# Patient Record
Sex: MALE | Race: BLACK OR AFRICAN AMERICAN | NOT HISPANIC OR LATINO | Employment: OTHER | URBAN - METROPOLITAN AREA
[De-identification: names, ages, dates, MRNs, and addresses within clinical notes are randomized per-mention and may not be internally consistent; named-entity substitution may affect disease eponyms.]

---

## 2022-08-23 ENCOUNTER — OFFICE VISIT (OUTPATIENT)
Dept: FAMILY MEDICINE CLINIC | Facility: CLINIC | Age: 72
End: 2022-08-23

## 2022-08-23 ENCOUNTER — LAB (OUTPATIENT)
Dept: LAB | Facility: HOSPITAL | Age: 72
End: 2022-08-23

## 2022-08-23 VITALS
OXYGEN SATURATION: 99 % | HEART RATE: 72 BPM | DIASTOLIC BLOOD PRESSURE: 82 MMHG | TEMPERATURE: 98.7 F | BODY MASS INDEX: 20.17 KG/M2 | SYSTOLIC BLOOD PRESSURE: 134 MMHG | RESPIRATION RATE: 18 BRPM | WEIGHT: 157.2 LBS | HEIGHT: 74 IN

## 2022-08-23 DIAGNOSIS — R21 RASH: ICD-10-CM

## 2022-08-23 DIAGNOSIS — R09.89 OTHER SPECIFIED SYMPTOMS AND SIGNS INVOLVING THE CIRCULATORY AND RESPIRATORY SYSTEMS: ICD-10-CM

## 2022-08-23 DIAGNOSIS — Z12.11 SCREEN FOR COLON CANCER: ICD-10-CM

## 2022-08-23 DIAGNOSIS — Z11.3 SCREENING EXAMINATION FOR STD (SEXUALLY TRANSMITTED DISEASE): ICD-10-CM

## 2022-08-23 DIAGNOSIS — M79.89 LEG SWELLING: ICD-10-CM

## 2022-08-23 DIAGNOSIS — Z00.01 ENCOUNTER FOR ROUTINE ADULT HEALTH EXAMINATION WITH ABNORMAL FINDINGS: ICD-10-CM

## 2022-08-23 DIAGNOSIS — Z13.6 SCREENING FOR AAA (ABDOMINAL AORTIC ANEURYSM): ICD-10-CM

## 2022-08-23 DIAGNOSIS — L29.9 PRURITUS, UNSPECIFIED: ICD-10-CM

## 2022-08-23 LAB
ALBUMIN SERPL-MCNC: 4.6 G/DL (ref 3.5–5.2)
ALBUMIN/GLOB SERPL: 1.4 G/DL
ALP SERPL-CCNC: 74 U/L (ref 39–117)
ALT SERPL W P-5'-P-CCNC: 9 U/L (ref 1–41)
ANION GAP SERPL CALCULATED.3IONS-SCNC: 11.2 MMOL/L (ref 5–15)
AST SERPL-CCNC: 18 U/L (ref 1–40)
BILIRUB SERPL-MCNC: 0.4 MG/DL (ref 0–1.2)
BUN SERPL-MCNC: 10 MG/DL (ref 8–23)
BUN/CREAT SERPL: 11 (ref 7–25)
CALCIUM SPEC-SCNC: 10.1 MG/DL (ref 8.6–10.5)
CHLORIDE SERPL-SCNC: 105 MMOL/L (ref 98–107)
CHOLEST SERPL-MCNC: 198 MG/DL (ref 0–200)
CO2 SERPL-SCNC: 25.8 MMOL/L (ref 22–29)
CREAT SERPL-MCNC: 0.91 MG/DL (ref 0.76–1.27)
CRP SERPL-MCNC: 0.53 MG/DL (ref 0–0.5)
EGFRCR SERPLBLD CKD-EPI 2021: 90.1 ML/MIN/1.73
GLOBULIN UR ELPH-MCNC: 3.3 GM/DL
GLUCOSE SERPL-MCNC: 81 MG/DL (ref 65–99)
HDLC SERPL-MCNC: 46 MG/DL (ref 40–60)
HIV1+2 AB SER QL: NORMAL
LDLC SERPL CALC-MCNC: 136 MG/DL (ref 0–100)
LDLC/HDLC SERPL: 2.91 {RATIO}
POTASSIUM SERPL-SCNC: 4.9 MMOL/L (ref 3.5–5.2)
PROT SERPL-MCNC: 7.9 G/DL (ref 6–8.5)
SODIUM SERPL-SCNC: 142 MMOL/L (ref 136–145)
TRIGL SERPL-MCNC: 90 MG/DL (ref 0–150)
TSH SERPL DL<=0.05 MIU/L-ACNC: 2.48 UIU/ML (ref 0.27–4.2)
VLDLC SERPL-MCNC: 16 MG/DL (ref 5–40)

## 2022-08-23 PROCEDURE — 86592 SYPHILIS TEST NON-TREP QUAL: CPT

## 2022-08-23 PROCEDURE — 99204 OFFICE O/P NEW MOD 45 MIN: CPT | Performed by: STUDENT IN AN ORGANIZED HEALTH CARE EDUCATION/TRAINING PROGRAM

## 2022-08-23 PROCEDURE — 1170F FXNL STATUS ASSESSED: CPT | Performed by: STUDENT IN AN ORGANIZED HEALTH CARE EDUCATION/TRAINING PROGRAM

## 2022-08-23 PROCEDURE — 84443 ASSAY THYROID STIM HORMONE: CPT | Performed by: STUDENT IN AN ORGANIZED HEALTH CARE EDUCATION/TRAINING PROGRAM

## 2022-08-23 PROCEDURE — G0432 EIA HIV-1/HIV-2 SCREEN: HCPCS | Performed by: STUDENT IN AN ORGANIZED HEALTH CARE EDUCATION/TRAINING PROGRAM

## 2022-08-23 PROCEDURE — 80061 LIPID PANEL: CPT | Performed by: STUDENT IN AN ORGANIZED HEALTH CARE EDUCATION/TRAINING PROGRAM

## 2022-08-23 PROCEDURE — 86803 HEPATITIS C AB TEST: CPT | Performed by: STUDENT IN AN ORGANIZED HEALTH CARE EDUCATION/TRAINING PROGRAM

## 2022-08-23 PROCEDURE — 86140 C-REACTIVE PROTEIN: CPT | Performed by: STUDENT IN AN ORGANIZED HEALTH CARE EDUCATION/TRAINING PROGRAM

## 2022-08-23 PROCEDURE — 80053 COMPREHEN METABOLIC PANEL: CPT | Performed by: STUDENT IN AN ORGANIZED HEALTH CARE EDUCATION/TRAINING PROGRAM

## 2022-08-23 PROCEDURE — 1159F MED LIST DOCD IN RCRD: CPT | Performed by: STUDENT IN AN ORGANIZED HEALTH CARE EDUCATION/TRAINING PROGRAM

## 2022-08-23 PROCEDURE — G0438 PPPS, INITIAL VISIT: HCPCS | Performed by: STUDENT IN AN ORGANIZED HEALTH CARE EDUCATION/TRAINING PROGRAM

## 2022-08-23 NOTE — PROGRESS NOTES
The ABCs of the Annual Wellness Visit  Initial Medicare Wellness Visit    Chief Complaint   Patient presents with   • Rash     Patient est care with new pcp and having rash on legs he said, thinks this may have been a reaction from a vaccine? Patient would like physical today     Subjective   History of Present Illness:  Marv Dobbins is a 72 y.o. male who presents for an Initial Medicare Wellness Visit in addition to his primary reason to establish care and discuss rash.  See separate note for HPI regarding rash.    The following portions of the patient's history were reviewed and   updated as appropriate: allergies, current medications, past family history, past medical history, past social history, past surgical history and problem list.     Compared to one year ago, the patient feels his physical   health is the same.    Compared to one year ago, the patient feels his mental   health is the same.    Recent Hospitalizations:  He was not admitted to the hospital during the last year.       Current Medical Providers:  Patient Care Team:  Lonnie Alanis MD as PCP - General (Family Medicine)    No outpatient medications prior to visit.     No facility-administered medications prior to visit.       No opioid medication identified on active medication list. I have reviewed chart for other potential  high risk medication/s and harmful drug interactions in the elderly.          Aspirin is not on active medication list.  Aspirin use is not indicated based on review of current medical condition/s. Risk of harm outweighs potential benefits.  .    There is no problem list on file for this patient.    Advance Care Planning  Advance Directive is not on file.  ACP discussion was held with the patient during this visit. Patient does not have an advance directive, information provided.          Objective       Vitals:    08/23/22 0900   BP: 134/82   BP Location: Left arm   Patient Position: Sitting   Cuff Size: Adult   Pulse: 72  "  Resp: 18   Temp: 98.7 °F (37.1 °C)   TempSrc: Temporal   SpO2: 99%   Weight: 71.3 kg (157 lb 3.2 oz)   Height: 188 cm (74\")     Estimated body mass index is 20.18 kg/m² as calculated from the following:    Height as of this encounter: 188 cm (74\").    Weight as of this encounter: 71.3 kg (157 lb 3.2 oz).    BMI is within normal parameters. No other follow-up for BMI required.      Does the patient have evidence of cognitive impairment? No    Physical Exam  Constitutional:       General: He is not in acute distress.     Appearance: He is not ill-appearing.   Cardiovascular:      Rate and Rhythm: Normal rate and regular rhythm.   Pulmonary:      Effort: Pulmonary effort is normal.      Breath sounds: Normal breath sounds.   Neurological:      Mental Status: He is alert.   Psychiatric:         Thought Content: Thought content normal.       Patient has numerous hyperpigmented lesions to the hands bilaterally.  Some dry skin of the hands.  He has multiple plaques to the lower legs bilaterally.  Small amount of clear drainage to the left anterior leg.  There is mild swelling to the left lower leg with some pitting.           HEALTH RISK ASSESSMENT    Smoking Status:  Social History     Tobacco Use   Smoking Status Current Every Day Smoker   • Packs/day: 0.25   • Years: 50.00   • Pack years: 12.50   Smokeless Tobacco Never Used     Alcohol Consumption:  Social History     Substance and Sexual Activity   Alcohol Use Yes    Comment: occassionally     Fall Risk Screen:    [facility], and patient is at LOW risk for falls.Assessment completed on:8/23/2022    Depression Screen:   PHQ-2/PHQ-9 Depression Screening 8/23/2022   Little Interest or Pleasure in Doing Things 0-->not at all   Feeling Down, Depressed or Hopeless 0-->not at all   PHQ-9: Brief Depression Severity Measure Score 0       Health Habits and Functional and Cognitive Screening:  Functional & Cognitive Status 8/23/2022   Do you have " difficulty preparing food and eating? No   Do you have difficulty bathing yourself, getting dressed or grooming yourself? No   Do you have difficulty using the toilet? No   Do you have difficulty moving around from place to place? No   Do you have trouble with steps or getting out of a bed or a chair? No   Current Diet Well Balanced Diet   Dental Exam Not up to date   Eye Exam Not up to date   Exercise (times per week) 3 times per week   Current Exercises Include Walking   Do you need help using the phone?  No   Are you deaf or do you have serious difficulty hearing?  No   Do you need help with transportation? No   Do you need help shopping? No   Do you need help preparing meals?  No   Do you need help with housework?  No   Do you need help with laundry? No   Do you need help taking your medications? No   Do you need help managing money? No   Do you ever drive or ride in a car without wearing a seat belt? No   Have you felt unusual stress, anger or loneliness in the last month? No   Who do you live with? Alone   If you need help, do you have trouble finding someone available to you? No   Have you been bothered in the last four weeks by sexual problems? No       Age-appropriate Screening Schedule:  Refer to the list below for future screening recommendations based on patient's age, sex and/or medical conditions. Orders for these recommended tests are listed in the plan section. The patient has been provided with a written plan.    Health Maintenance   Topic Date Due   • TDAP/TD VACCINES (1 - Tdap) Never done   • ZOSTER VACCINE (1 of 2) Never done   • INFLUENZA VACCINE  10/01/2022            Assessment & Plan   CMS Preventative Services Quick Reference  Risk Factors Identified During Encounter  None Identified  The above risks/problems have been discussed with the patient.  Follow up actions/plans if indicated are seen below in the Assessment/Plan Section.  Pertinent information has been shared with the patient in the  After Visit Summary.    Diagnoses and all orders for this visit:    1. Encounter for routine adult health examination with abnormal findings  -     Comprehensive Metabolic Panel  -     Lipid Panel    2. Screen for colon cancer  -     Cologuard - Stool, Per Rectum; Future    3. Rash  -     Ambulatory Referral to Dermatology  -     US Ankle / Brachial Indices Extremity Limited; Future  -     C-reactive protein  -     TSH Rfx On Abnormal To Free T4  -     HIV-1 / O / 2 Ag / Antibody 4th Generation  -     Hepatitis C Virus Ab Cascade  -     RPR; Future  -     Multiple plaques of uncertain etiology and prognosis requiring further evaluation with labs.  Given he has plaques on legs and left foot will get MARKOS to rule out arterial disease but this seems less likely.  He also has rash to the hands.  Refer to dermatology for further evaluation.    4. Leg swelling  -     TSH Rfx On Abnormal To Free T4    5. Pruritus, unspecified   -     TSH Rfx On Abnormal To Free T4    6. Other specified symptoms and signs involving the circulatory and respiratory systems   -     US Ankle / Brachial Indices Extremity Limited; Future  -     Some mild left leg swelling and will rule out vascular disease    7. Screening examination for STD (sexually transmitted disease)  -     HIV-1 / O / 2 Ag / Antibody 4th Generation  -     Hepatitis C Virus Ab Cascade  -     RPR; Future    8. Screening for AAA (abdominal aortic aneurysm)  -     US aaa screen limited; Future    Further review vaccines next visit  Follow Up:  Return in about 3 months (around 11/23/2022) for Follow-up.     An After Visit Summary and PPPS were made available to the patient.     Evaluation of rash of uncertain etiology and prognosis requiring further evaluation performed in addition to the wellness visit today.  Additional labs performed for this rash.    Lonnie Alanis MD  Family Medicine - [facility]

## 2022-08-23 NOTE — PATIENT INSTRUCTIONS
Compression stockings for the legs.   Medicare Wellness  Personal Prevention Plan of Service     Date of Office Visit:    Encounter Provider:  Lonnie Alanis MD  Place of Service:  [facility]  Patient Name: Marv Dobbins  :  1950    As part of the Medicare Wellness portion of your visit today, we are providing you with this personalized preventive plan of services (PPPS). This plan is based upon recommendations of the [place] Preventive Services Task Force (USPSTF) and the Advisory Committee on Immunization Practices (ACIP).    This lists the preventive care services that should be considered, and provides dates of when you are due. Items listed as completed are up-to-date and do not require any further intervention.    Health Maintenance   Topic Date Due    COLORECTAL CANCER SCREENING  Never done    TDAP/TD VACCINES (1 - Tdap) Never done    ZOSTER VACCINE (1 of 2) Never done    COVID-19 Vaccine (4 - Booster for Pfizer series) 2022    HEPATITIS C SCREENING  Never done    AAA SCREEN (ONE-TIME)  Never done    Pneumococcal Vaccine 65+ (1 - PCV) 10/27/2022 (Originally 1956)    INFLUENZA VACCINE  10/01/2022    ANNUAL WELLNESS VISIT  2023       Orders Placed This Encounter   Procedures    US Ankle / Brachial Indices Extremity Limited     Standing Status:   Future     Standing Expiration Date:   2023     Order Specific Question:   Reason for Exam:     Answer:   eval for peripheral artery disease     Order Specific Question:   Release to patient     Answer:   Routine Release    US aaa screen limited     Standing Status:   Future     Standing Expiration Date:   2023     Order Specific Question:   Is the patient a male between 65-75 years old and have smoked at least 100 cigarettes in their lifetime OR a male or female Medicare patient who has a family history of abdominal aoritc aneurysm?     Answer:   Yes     Order Specific Question:   Reason for Exam:      Answer:   screening aaa     Order Specific Question:   Release to patient     Answer:   Routine Release    Cologuard - Stool, Per Rectum     Standing Status:   Future     Standing Expiration Date:   8/23/2023     Order Specific Question:   Release to patient     Answer:   Routine Release    Comprehensive Metabolic Panel     Order Specific Question:   Release to patient     Answer:   Routine Release    Lipid Panel    C-reactive protein     Order Specific Question:   Release to patient     Answer:   Routine Release    HIV-1 / O / 2 Ag / Antibody 4th Generation    Hepatitis C Virus Ab Cascade    RPR     Standing Status:   Future     Standing Expiration Date:   8/23/2023    TSH Rfx On Abnormal To Free T4     Order Specific Question:   Release to patient     Answer:   Routine Release    Ambulatory Referral to Dermatology     Referral Priority:   Routine     Referral Type:   Consultation     Referral Reason:   Specialty Services Required     Requested Specialty:   Dermatology     Number of Visits Requested:   1       Return in about 3 months (around 11/23/2022) for Follow-up.

## 2022-08-23 NOTE — PROGRESS NOTES
"  New Patient Office Visit      Subjective      Chief Complaint:  Rash (Patient est care with new pcp and having rash on legs he said, thinks this may have been a reaction from a vaccine? Patient would like physical today)      History of Present Illness: Marv Dobbins is a 72 y.o. male who presents for a new patient visit he is here primarily for rash but also for Medicare wellness visit.    Patient with rash that started to the legs and the left foot about 1 to 2 months ago.  He has tried some cream over-the-counter but is unsure the name of the cream.  It did improve his right popliteal fossa rash.  He does have some drainage to the left anterior leg.  About a week or 2 ago he noticed some dark spots to the hands.    He denies noticeable weight loss but people have told him he looks thin.  He feels well other than rash.      History reviewed. No pertinent past medical history.    Past Surgical History:   Procedure Laterality Date   • SKIN GRAFT Right     patient said this was 20-30 years ago on right foot (top of foot)       Family History   Problem Relation Age of Onset   • Kidney disease Mother        Social History     Socioeconomic History   • Marital status: Single   Tobacco Use   • Smoking status: Current Every Day Smoker     Packs/day: 0.25     Years: 50.00     Pack years: 12.50   • Smokeless tobacco: Never Used   Vaping Use   • Vaping Use: Never used   Substance and Sexual Activity   • Alcohol use: Yes     Comment: occassionally   • Drug use: Never   • Sexual activity: Not Currently       No current outpatient medications on file.    No Known Allergies    Objective     Physical Exam:  Vital Signs:  /82 (BP Location: Left arm, Patient Position: Sitting, Cuff Size: Adult)   Pulse 72   Temp 98.7 °F (37.1 °C) (Temporal)   Resp 18   Ht 188 cm (74\")   Wt 71.3 kg (157 lb 3.2 oz)   SpO2 99%   BMI 20.18 kg/m²      Physical Exam  Constitutional:       General: He is not in acute distress.     Appearance: " He is not ill-appearing.   Eyes:      Extraocular Movements: Extraocular movements intact.   Neck:      Thyroid: No thyromegaly.   Cardiovascular:      Rate and Rhythm: Normal rate and regular rhythm.      Heart sounds: No murmur heard.   Pulmonary:      Effort: Pulmonary effort is normal.      Breath sounds: Normal breath sounds.   Abdominal:      Palpations: Abdomen is soft.   Lymphadenopathy:      Cervical: No cervical adenopathy.  No supraclavicular adenopathy  Skin:     General: Skin is warm and dry.   Neurological:      Mental Status: He is alert.   Psychiatric:         Thought Content: Thought content normal.      Patient has numerous small hyperpigmented lesions to the hands bilaterally.  Some dry skin of the hands.  He has multiple plaques to the lower legs bilaterally.  Small amount of clear drainage to the left anterior leg. Plaque to the left dorsal foot. Lichenification to the right popliteal fossa. There is mild swelling to the left lower leg with some pitting.            Assessment / Plan      Assessment/Plan:   Diagnoses and all orders for this visit:    1. Rash  -     Ambulatory Referral to Dermatology  -     US Ankle / Brachial Indices Extremity Limited; Future  -     C-reactive protein  -     TSH Rfx On Abnormal To Free T4  -     Multiple plaques of uncertain etiology and prognosis requiring further evaluation with labs.  Given he has plaques on legs and left foot will get MARKOS to rule out arterial disease but this seems less likely.  He also has rash to the hands.  Refer to dermatology for further evaluation.    2. Leg swelling  -     TSH Rfx On Abnormal To Free T4    3. Pruritus, unspecified   -     TSH Rfx On Abnormal To Free T4    4. Encounter for routine adult health examination with abnormal findings  -     Comprehensive Metabolic Panel  -     Lipid Panel    5. Screen for colon cancer  -     Cologuard - Stool, Per Rectum; Future    6. Other specified symptoms and signs involving the  circulatory and respiratory systems   -     US Ankle / Brachial Indices Extremity Limited; Future    7. Screening examination for STD (sexually transmitted disease)  -     HIV-1 / O / 2 Ag / Antibody 4th Generation  -     Hepatitis C Virus Ab Cascade  -     RPR; Future    8. Screening for AAA (abdominal aortic aneurysm)  -     US aaa screen limited; Future          Follow Up:   Return in about 3 months (around 11/23/2022) for Follow-up.    MDM: [id number]     Lonnie Alanis MD  Family Medicine - [facility]

## 2022-08-24 LAB
HCV AB S/CO SERPL IA: <0.1 S/CO RATIO (ref 0–0.9)
HCV AB SERPL QL IA: NORMAL
RPR SER QL: NORMAL

## 2022-09-02 DIAGNOSIS — R19.5 POSITIVE COLORECTAL CANCER SCREENING USING COLOGUARD TEST: Primary | ICD-10-CM

## 2022-09-27 ENCOUNTER — HOSPITAL ENCOUNTER (OUTPATIENT)
Dept: CARDIOLOGY | Facility: HOSPITAL | Age: 72
Discharge: HOME | End: 2022-09-27

## 2022-09-27 ENCOUNTER — HOSPITAL ENCOUNTER (OUTPATIENT)
Dept: ULTRASOUND IMAGING | Facility: HOSPITAL | Age: 72
Discharge: HOME | End: 2022-09-27

## 2022-09-27 VITALS — BODY MASS INDEX: 19.38 KG/M2 | WEIGHT: 151 LBS | HEIGHT: 74 IN

## 2022-09-27 DIAGNOSIS — R09.89 OTHER SPECIFIED SYMPTOMS AND SIGNS INVOLVING THE CIRCULATORY AND RESPIRATORY SYSTEMS: ICD-10-CM

## 2022-09-27 DIAGNOSIS — M79.89 LEG SWELLING: ICD-10-CM

## 2022-09-27 DIAGNOSIS — R21 RASH: ICD-10-CM

## 2022-09-27 DIAGNOSIS — Z13.6 SCREENING FOR AAA (ABDOMINAL AORTIC ANEURYSM): ICD-10-CM

## 2022-09-27 LAB
BH CV LOWER ARTERIAL LEFT ABI RATIO: 1.16
BH CV LOWER ARTERIAL LEFT DORSALIS PEDIS SYS MAX: 164
BH CV LOWER ARTERIAL LEFT GREAT TOE SYS MAX: 137
BH CV LOWER ARTERIAL LEFT POST TIBIAL SYS MAX: 177
BH CV LOWER ARTERIAL LEFT TBI RATIO: 0.9
BH CV LOWER ARTERIAL RIGHT ABI RATIO: 1.13
BH CV LOWER ARTERIAL RIGHT DORSALIS PEDIS SYS MAX: 172
BH CV LOWER ARTERIAL RIGHT GREAT TOE SYS MAX: 125
BH CV LOWER ARTERIAL RIGHT POST TIBIAL SYS MAX: 162
BH CV LOWER ARTERIAL RIGHT TBI RATIO: 0.82
MAXIMAL PREDICTED HEART RATE: 149 BPM
STRESS TARGET HR: 127 BPM
UPPER ARTERIAL LEFT ARM BRACHIAL SYS MAX: 151 MMHG
UPPER ARTERIAL RIGHT ARM BRACHIAL SYS MAX: 152 MMHG

## 2022-09-27 PROCEDURE — 93922 UPR/L XTREMITY ART 2 LEVELS: CPT | Performed by: INTERNAL MEDICINE

## 2022-09-27 PROCEDURE — 76706 US ABDL AORTA SCREEN AAA: CPT

## 2022-09-27 PROCEDURE — 93922 UPR/L XTREMITY ART 2 LEVELS: CPT

## 2022-10-10 DIAGNOSIS — Z12.11 ENCOUNTER FOR SCREENING COLONOSCOPY: Primary | ICD-10-CM

## 2023-09-06 ENCOUNTER — TELEPHONE (OUTPATIENT)
Dept: FAMILY MEDICINE CLINIC | Facility: CLINIC | Age: 73
End: 2023-09-06
Payer: MEDICARE

## 2023-09-08 ENCOUNTER — OFFICE VISIT (OUTPATIENT)
Dept: FAMILY MEDICINE CLINIC | Facility: CLINIC | Age: 73
End: 2023-09-08
Payer: MEDICARE

## 2023-09-08 VITALS
SYSTOLIC BLOOD PRESSURE: 130 MMHG | TEMPERATURE: 98.7 F | OXYGEN SATURATION: 98 % | WEIGHT: 156.8 LBS | HEIGHT: 74 IN | BODY MASS INDEX: 20.12 KG/M2 | HEART RATE: 100 BPM | DIASTOLIC BLOOD PRESSURE: 60 MMHG

## 2023-09-08 DIAGNOSIS — L40.9 PSORIASIS: Primary | ICD-10-CM

## 2023-09-08 PROCEDURE — 99214 OFFICE O/P EST MOD 30 MIN: CPT | Performed by: STUDENT IN AN ORGANIZED HEALTH CARE EDUCATION/TRAINING PROGRAM

## 2023-09-08 RX ORDER — MUPIROCIN CALCIUM 20 MG/G
1 CREAM TOPICAL 3 TIMES DAILY
Qty: 30 G | Refills: 1 | Status: SHIPPED | OUTPATIENT
Start: 2023-09-08

## 2023-09-08 NOTE — LETTER
"September 8, 2023       No Recipients    Patient: Marv Dobbins   YOB: 1950   Date of Visit: 9/8/2023       Dear MONTANA Aparicio    Marv Dobbins was in my office today. Below is a copy of my note.    If you have questions, please do not hesitate to call me. I look forward to following Marv along with you.         Sincerely,        Lonnie Alanis MD        CC:   No Recipients      Established Patient Office Visit        Subjective      Chief Complaint:  Rash (Rash started after he got a flu shot 2 years ago)      History of Present Illness: Marv Dobbins is a 73 y.o. male who presents for worsening rash. Mild swelling with mild drainage form the umbilicus.       There is no problem list on file for this patient.        Current Outpatient Medications:   •  fluocinolone 0.01 % cream, Apply 1 application  topically to the appropriate area as directed 2 (Two) Times a Day. No not apply to face, Disp: 60 g, Rfl: 6  •  hydrocortisone 2.5 % cream, Apply 1 application  topically to the appropriate area as directed 2 (Two) Times a Day. Apply to face and ears, Disp: 453.6 g, Rfl: 5  •  mupirocin (BACTROBAN) 2 % cream, Apply 1 application  topically to the appropriate area as directed 3 (Three) Times a Day. Apply to belly button, Disp: 30 g, Rfl: 1  •  Sod Picosulfate-Mag Ox-Cit Acd 10-3.5-12 MG-GM -GM/160ML solution, Take 160 mL by mouth Take As Directed for 2 doses. (Patient not taking: Reported on 9/8/2023), Disp: 320 mL, Rfl: 0       Objective     Physical Exam:   Vital Signs:   /60 (BP Location: Left arm, Patient Position: Sitting, Cuff Size: Adult)   Pulse 100   Temp 98.7 °F (37.1 °C) (Temporal)   Ht 188 cm (74.02\")   Wt 71.1 kg (156 lb 12.8 oz)   SpO2 98%   BMI 20.12 kg/m²      Physical Exam  Constitutional:       General: He is not in acute distress.     Appearance: He is not ill-appearing.   Cardiovascular:      Rate and Rhythm: Normal rate and regular rhythm.   Pulmonary:      Effort: " Pulmonary effort is normal.      Breath sounds: Normal breath sounds.   Neurological:      Mental Status: He is alert.   Psychiatric:         Thought Content: Thought content normal.   Rash to legs and right foot. Right foot with some cracking.   Plaque to the ears.   Small cyst to the umbilicus.            Assessment / Plan      Assessment/Plan:   Diagnoses and all orders for this visit:    1. Psoriasis (Primary)  -     fluocinolone 0.01 % cream; Apply 1 application  topically to the appropriate area as directed 2 (Two) Times a Day. No not apply to face  Dispense: 60 g; Refill: 6  -     mupirocin (BACTROBAN) 2 % cream; Apply 1 application  topically to the appropriate area as directed 3 (Three) Times a Day. Apply to belly button  Dispense: 30 g; Refill: 1  -     hydrocortisone 2.5 % cream; Apply 1 application  topically to the appropriate area as directed 2 (Two) Times a Day. Apply to face and ears  Dispense: 453.6 g; Refill: 5           Follow Up:   No follow-ups on file.      MDM:     Lonnie Alains MD  Family Medicine - [facility]

## 2023-09-08 NOTE — PROGRESS NOTES
"  Established Patient Office Visit        Subjective      Chief Complaint:  Rash (Rash started after he got a flu shot 2 years ago)      History of Present Illness: Marv Dobbins is a 73 y.o. male who presents for worsening rash. Worst aspect is the left foot Mild swelling with mild drainage form the umbilicus.       There is no problem list on file for this patient.        Current Outpatient Medications:     fluocinolone 0.01 % cream, Apply 1 application  topically to the appropriate area as directed 2 (Two) Times a Day. No not apply to face, Disp: 60 g, Rfl: 6    hydrocortisone 2.5 % cream, Apply 1 application  topically to the appropriate area as directed 2 (Two) Times a Day. Apply to face and ears, Disp: 453.6 g, Rfl: 5    mupirocin (BACTROBAN) 2 % cream, Apply 1 application  topically to the appropriate area as directed 3 (Three) Times a Day. Apply to belly button, Disp: 30 g, Rfl: 1       Objective     Physical Exam:   Vital Signs:   /60 (BP Location: Left arm, Patient Position: Sitting, Cuff Size: Adult)   Pulse 100   Temp 98.7 °F (37.1 °C) (Temporal)   Ht 188 cm (74.02\")   Wt 71.1 kg (156 lb 12.8 oz)   SpO2 98%   BMI 20.12 kg/m²      Physical Exam  Constitutional:       General: He is not in acute distress.     Appearance: He is not ill-appearing.   Cardiovascular:      Rate and Rhythm: Normal rate and regular rhythm.   Pulmonary:      Effort: Pulmonary effort is normal.      Breath sounds: Normal breath sounds.   Neurological:      Mental Status: He is alert.   Psychiatric:         Thought Content: Thought content normal.   plaque to legs and left foot. Left foot rash with some clear drainage . left foot with some cracking.   Plaque to the ears.   Small cyst/edematous tissue to the umbilicus with clear drainage.             Assessment / Plan      Assessment/Plan:   Diagnoses and all orders for this visit:    1. Psoriasis (Primary)  -     fluocinolone 0.01 % cream; Apply 1 application  topically " to the appropriate area as directed 2 (Two) Times a Day. No not apply to face  Dispense: 60 g; Refill: 6  -     mupirocin (BACTROBAN) 2 % cream; Apply 1 application  topically to the appropriate area as directed 3 (Three) Times a Day. Apply to belly button  Dispense: 30 g; Refill: 1  -     hydrocortisone 2.5 % cream; Apply 1 application  topically to the appropriate area as directed 2 (Two) Times a Day. Apply to face and ears  Dispense: 453.6 g; Refill: 5      Restart steroid creams as above.  Follow-up with dermatology.  Apply emollients such as Vaseline after steroid dries.    He states he has been put on oral medicine per dermatology but he does not remember the name of it.    For umbilicus with trial mupirocin. F/u for lack of improvement or worsening.       Follow Up:   Return if symptoms worsen or fail to improve, for keep wellness check up nov 8th .      MDM: Is a chronic worsening condition.    Lonnie Alanis MD  Family Medicine - [facility]

## 2023-09-15 DIAGNOSIS — L40.9 PSORIASIS: ICD-10-CM

## 2023-09-15 RX ORDER — MUPIROCIN CALCIUM 20 MG/G
1 CREAM TOPICAL 3 TIMES DAILY
Qty: 30 G | Refills: 1 | Status: SHIPPED | OUTPATIENT
Start: 2023-09-15

## 2023-11-08 ENCOUNTER — OFFICE VISIT (OUTPATIENT)
Dept: FAMILY MEDICINE CLINIC | Facility: CLINIC | Age: 73
End: 2023-11-08
Payer: MEDICARE

## 2023-11-08 ENCOUNTER — LAB (OUTPATIENT)
Dept: LAB | Facility: HOSPITAL | Age: 73
End: 2023-11-08
Payer: MEDICARE

## 2023-11-08 VITALS
RESPIRATION RATE: 18 BRPM | BODY MASS INDEX: 19.72 KG/M2 | OXYGEN SATURATION: 100 % | HEART RATE: 75 BPM | WEIGHT: 158.6 LBS | DIASTOLIC BLOOD PRESSURE: 75 MMHG | SYSTOLIC BLOOD PRESSURE: 135 MMHG | TEMPERATURE: 98.6 F | HEIGHT: 75 IN

## 2023-11-08 DIAGNOSIS — R97.8 OTHER ABNORMAL TUMOR MARKERS: ICD-10-CM

## 2023-11-08 DIAGNOSIS — N62 GYNECOMASTIA: ICD-10-CM

## 2023-11-08 DIAGNOSIS — Z11.3 SCREENING EXAMINATION FOR STD (SEXUALLY TRANSMITTED DISEASE): ICD-10-CM

## 2023-11-08 DIAGNOSIS — R73.9 ELEVATED BLOOD SUGAR: ICD-10-CM

## 2023-11-08 DIAGNOSIS — Z00.00 ENCOUNTER FOR ROUTINE ADULT HEALTH EXAMINATION WITHOUT ABNORMAL FINDINGS: Primary | ICD-10-CM

## 2023-11-08 DIAGNOSIS — Z00.00 ENCOUNTER FOR ROUTINE ADULT HEALTH EXAMINATION WITHOUT ABNORMAL FINDINGS: ICD-10-CM

## 2023-11-08 LAB
ALBUMIN SERPL-MCNC: 4.2 G/DL (ref 3.5–5.2)
ALBUMIN/GLOB SERPL: 1.2 G/DL
ALP SERPL-CCNC: 62 U/L (ref 39–117)
ALT SERPL W P-5'-P-CCNC: 26 U/L (ref 1–41)
ANION GAP SERPL CALCULATED.3IONS-SCNC: 11.8 MMOL/L (ref 5–15)
AST SERPL-CCNC: 31 U/L (ref 1–40)
BASOPHILS # BLD AUTO: 0.02 10*3/MM3 (ref 0–0.2)
BASOPHILS NFR BLD AUTO: 0.6 % (ref 0–1.5)
BILIRUB SERPL-MCNC: 0.5 MG/DL (ref 0–1.2)
BUN SERPL-MCNC: 10 MG/DL (ref 8–23)
BUN/CREAT SERPL: 8.8 (ref 7–25)
CALCIUM SPEC-SCNC: 9.5 MG/DL (ref 8.6–10.5)
CHLORIDE SERPL-SCNC: 105 MMOL/L (ref 98–107)
CHOLEST SERPL-MCNC: 206 MG/DL (ref 0–200)
CO2 SERPL-SCNC: 23.2 MMOL/L (ref 22–29)
CREAT SERPL-MCNC: 1.13 MG/DL (ref 0.76–1.27)
DEPRECATED RDW RBC AUTO: 45.5 FL (ref 37–54)
EGFRCR SERPLBLD CKD-EPI 2021: 69.1 ML/MIN/1.73
EOSINOPHIL # BLD AUTO: 0.22 10*3/MM3 (ref 0–0.4)
EOSINOPHIL NFR BLD AUTO: 6.8 % (ref 0.3–6.2)
ERYTHROCYTE [DISTWIDTH] IN BLOOD BY AUTOMATED COUNT: 13 % (ref 12.3–15.4)
ESTRADIOL SERPL HS-MCNC: 27.6 PG/ML
FSH SERPL-ACNC: 31.5 MIU/ML
GLOBULIN UR ELPH-MCNC: 3.5 GM/DL
GLUCOSE SERPL-MCNC: 82 MG/DL (ref 65–99)
HBA1C MFR BLD: 5.6 % (ref 4.8–5.6)
HCT VFR BLD AUTO: 42.5 % (ref 37.5–51)
HCV AB SER DONR QL: NORMAL
HDLC SERPL-MCNC: 57 MG/DL (ref 40–60)
HGB BLD-MCNC: 14.8 G/DL (ref 13–17.7)
HIV 1+2 AB+HIV1 P24 AG SERPL QL IA: NORMAL
IMM GRANULOCYTES # BLD AUTO: 0.01 10*3/MM3 (ref 0–0.05)
IMM GRANULOCYTES NFR BLD AUTO: 0.3 % (ref 0–0.5)
LDLC SERPL CALC-MCNC: 137 MG/DL (ref 0–100)
LDLC/HDLC SERPL: 2.37 {RATIO}
LH SERPL-ACNC: 10.6 MIU/ML
LYMPHOCYTES # BLD AUTO: 1.14 10*3/MM3 (ref 0.7–3.1)
LYMPHOCYTES NFR BLD AUTO: 35.4 % (ref 19.6–45.3)
MCH RBC QN AUTO: 33.3 PG (ref 26.6–33)
MCHC RBC AUTO-ENTMCNC: 34.8 G/DL (ref 31.5–35.7)
MCV RBC AUTO: 95.5 FL (ref 79–97)
MONOCYTES # BLD AUTO: 0.61 10*3/MM3 (ref 0.1–0.9)
MONOCYTES NFR BLD AUTO: 18.9 % (ref 5–12)
NEUTROPHILS NFR BLD AUTO: 1.22 10*3/MM3 (ref 1.7–7)
NEUTROPHILS NFR BLD AUTO: 38 % (ref 42.7–76)
NRBC BLD AUTO-RTO: 0 /100 WBC (ref 0–0.2)
PLATELET # BLD AUTO: 256 10*3/MM3 (ref 140–450)
PMV BLD AUTO: 10.4 FL (ref 6–12)
POTASSIUM SERPL-SCNC: 4.7 MMOL/L (ref 3.5–5.2)
PROT SERPL-MCNC: 7.7 G/DL (ref 6–8.5)
RBC # BLD AUTO: 4.45 10*6/MM3 (ref 4.14–5.8)
SODIUM SERPL-SCNC: 140 MMOL/L (ref 136–145)
TESTOST SERPL-MCNC: 353 NG/DL (ref 193–740)
TRIGL SERPL-MCNC: 69 MG/DL (ref 0–150)
VLDLC SERPL-MCNC: 12 MG/DL (ref 5–40)
WBC NRBC COR # BLD: 3.22 10*3/MM3 (ref 3.4–10.8)

## 2023-11-08 PROCEDURE — 84702 CHORIONIC GONADOTROPIN TEST: CPT

## 2023-11-08 PROCEDURE — 83036 HEMOGLOBIN GLYCOSYLATED A1C: CPT

## 2023-11-08 PROCEDURE — 83001 ASSAY OF GONADOTROPIN (FSH): CPT

## 2023-11-08 PROCEDURE — 83002 ASSAY OF GONADOTROPIN (LH): CPT

## 2023-11-08 PROCEDURE — 80053 COMPREHEN METABOLIC PANEL: CPT

## 2023-11-08 PROCEDURE — 86803 HEPATITIS C AB TEST: CPT

## 2023-11-08 PROCEDURE — G0432 EIA HIV-1/HIV-2 SCREEN: HCPCS

## 2023-11-08 PROCEDURE — 36415 COLL VENOUS BLD VENIPUNCTURE: CPT

## 2023-11-08 PROCEDURE — 84403 ASSAY OF TOTAL TESTOSTERONE: CPT

## 2023-11-08 PROCEDURE — 80061 LIPID PANEL: CPT

## 2023-11-08 PROCEDURE — 82670 ASSAY OF TOTAL ESTRADIOL: CPT

## 2023-11-08 PROCEDURE — 85025 COMPLETE CBC W/AUTO DIFF WBC: CPT

## 2023-11-08 NOTE — PROGRESS NOTES
The ABCs of the Annual Wellness Visit  Subsequent Medicare Wellness Visit    Subjective      Marv Dobbins is a 73 y.o. male who presents for a Subsequent Medicare Wellness Visit.    The following portions of the patient's history were reviewed and   updated as appropriate: allergies, current medications, past family history, past medical history, past social history, past surgical history, and problem list.    Some swelling to the breast under nipple and some tenderness. no testicular changes.     Compared to one year ago, the patient feels his physical   health is the same.    Compared to one year ago, the patient feels his mental   health is the same.    Recent Hospitalizations:  He was not admitted to the hospital during the last year.       Current Medical Providers:  Patient Care Team:  Lonnie Alanis MD as PCP - General (Family Medicine)    Outpatient Medications Prior to Visit   Medication Sig Dispense Refill    fluocinolone 0.01 % cream Apply 1 application  topically to the appropriate area as directed 2 (Two) Times a Day. No not apply to face 60 g 6    hydrocortisone 2.5 % cream Apply 1 application  topically to the appropriate area as directed 2 (Two) Times a Day. Apply to face and ears 453.6 g 5    mupirocin (BACTROBAN) 2 % cream Apply 1 application  topically to the appropriate area as directed 3 (Three) Times a Day. Apply to belly button 30 g 1     No facility-administered medications prior to visit.       No opioid medication identified on active medication list. I have reviewed chart for other potential  high risk medication/s and harmful drug interactions in the elderly.        Aspirin is not on active medication list.  Aspirin use is not indicated based on review of current medical condition/s. Risk of harm outweighs potential benefits.  .    There is no problem list on file for this patient.    Advance Care Planning   Advance Care Planning     Advance Directive is not on file.  ACP discussion was  "held with the patient during this visit. Patient does not have an advance directive, information provided.     Objective    Vitals:    23 0748 23 0819   BP: 136/90 135/75   Pulse: 75    Resp: 18    Temp: 98.6 °F (37 °C)    SpO2: 100%    Weight: 71.9 kg (158 lb 9.6 oz)    Height: 190.5 cm (75\")    PainSc: 0-No pain      Estimated body mass index is 19.82 kg/m² as calculated from the following:    Height as of this encounter: 190.5 cm (75\").    Weight as of this encounter: 71.9 kg (158 lb 9.6 oz).    BMI is within normal parameters. No other follow-up for BMI required.      Does the patient have evidence of cognitive impairment?   No            HEALTH RISK ASSESSMENT    Smoking Status:  Social History     Tobacco Use   Smoking Status Every Day    Packs/day: 0.25    Years: 50.00    Additional pack years: 0.00    Total pack years: 12.50    Types: Cigarettes   Smokeless Tobacco Never     Alcohol Consumption:  Social History     Substance and Sexual Activity   Alcohol Use Yes    Comment: occassionally     Fall Risk Screen:    STEADI Fall Risk Assessment was completed, and patient is at LOW risk for falls.Assessment completed on:2023    Depression Screenin/8/2023     7:56 AM   PHQ-2/PHQ-9 Depression Screening   Little Interest or Pleasure in Doing Things 0-->not at all   Feeling Down, Depressed or Hopeless 0-->not at all   PHQ-9: Brief Depression Severity Measure Score 0       Health Habits and Functional and Cognitive Screenin/8/2023     7:56 AM   Functional & Cognitive Status   Do you have difficulty preparing food and eating? No   Do you have difficulty bathing yourself, getting dressed or grooming yourself? No   Do you have difficulty using the toilet? No   Do you have difficulty moving around from place to place? No   Do you have trouble with steps or getting out of a bed or a chair? No   Current Diet Well Balanced Diet   Dental Exam Not up to date   Eye Exam Not up to date "   Exercise (times per week) 0 times per week   Current Exercises Include No Regular Exercise   Do you need help using the phone?  No   Are you deaf or do you have serious difficulty hearing?  No   Do you need help to go to places out of walking distance? No   Do you need help shopping? No   Do you need help preparing meals?  No   Do you need help with housework?  No   Do you need help with laundry? No   Do you need help taking your medications? No   Do you need help managing money? No   Do you ever drive or ride in a car without wearing a seat belt? No   Have you felt unusual stress, anger or loneliness in the last month? No   Who do you live with? Alone   If you need help, do you have trouble finding someone available to you? No       Age-appropriate Screening Schedule:  Refer to the list below for future screening recommendations based on patient's age, sex and/or medical conditions. Orders for these recommended tests are listed in the plan section. The patient has been provided with a written plan.    Health Maintenance   Topic Date Due    Pneumococcal Vaccine 65+ (1 - PCV) Never done    TDAP/TD VACCINES (1 - Tdap) Never done    ZOSTER VACCINE (1 of 2) Never done    HEPATITIS C SCREENING  Never done    COVID-19 Vaccine (4 - 2023-24 season) 09/01/2023    ANNUAL WELLNESS VISIT  11/08/2024    COLORECTAL CANCER SCREENING  08/26/2025    AAA SCREEN (ONE-TIME)  Completed                  CMS Preventative Services Quick Reference  Risk Factors Identified During Encounter:    None Identified    The above risks/problems have been discussed with the patient.  Pertinent information has been shared with the patient in the After Visit Summary.    Diagnoses and all orders for this visit:    1. Encounter for routine adult health examination without abnormal findings (Primary)  -     Lipid Panel; Future  -     Comprehensive Metabolic Panel; Future  -     CBC & Differential; Future  -     Hepatitis C antibody; Future  -      HIV-1/O/2 Ag/Ab w Reflex; Future    2. Screening examination for STD (sexually transmitted disease)  -     Hepatitis C antibody; Future  -     HIV-1/O/2 Ag/Ab w Reflex; Future    3. Elevated blood sugar  -     Hemoglobin A1c; Future    4. Gynecomastia  -     Testosterone; Future  -     Estradiol; Future  -     FSH & LH; Future  -     HCG, B-subunit, Quantitative; Future    5. Other abnormal tumor markers  -     HCG, B-subunit, Quantitative; Future      Declines Prevnar, Tdap, shingles and flu  Emphasized important of colonoscopy given + cologaurd. Number given to call to schedule     Follow Up:   Next Medicare Wellness visit to be scheduled in 1 year.      An After Visit Summary and PPPS were made available to the patient.    Lonnie Aalnis MD  Family Medicine - [facility]

## 2023-11-08 NOTE — PATIENT INSTRUCTIONS
Call to schedule colonoscopy     [address], [place] [address]   [phone]    Medicare Wellness  Personal Prevention Plan of Service     Date of Office Visit:    Encounter Provider:  Lonnie Alanis MD  Place of Service:  [facility]  Patient Name: Marv Dobbins  :  1950    As part of the Medicare Wellness portion of your visit today, we are providing you with this personalized preventive plan of services (PPPS). This plan is based upon recommendations of the [place] Preventive Services Task Force (USPSTF) and the Advisory Committee on Immunization Practices (ACIP).    This lists the preventive care services that should be considered, and provides dates of when you are due. Items listed as completed are up-to-date and do not require any further intervention.    Health Maintenance   Topic Date Due    Pneumococcal Vaccine 65+ (1 - PCV) Never done    TDAP/TD VACCINES (1 - Tdap) Never done    ZOSTER VACCINE (1 of 2) Never done    HEPATITIS C SCREENING  Never done    ANNUAL WELLNESS VISIT  2023    COVID-19 Vaccine ( - -24 season) 2023    COLORECTAL CANCER SCREENING  2025    AAA SCREEN (ONE-TIME)  Completed       Orders Placed This Encounter   Procedures    Lipid Panel     Standing Status:   Future     Standing Expiration Date:   2024     Order Specific Question:   Release to patient     Answer:   Routine Release [[id number]]    Comprehensive Metabolic Panel     Standing Status:   Future     Standing Expiration Date:   2024     Order Specific Question:   Release to patient     Answer:   Routine Release [[id number]]    Hepatitis C antibody     Standing Status:   Future     Standing Expiration Date:   2024     Order Specific Question:   Release to patient     Answer:   Routine Release [[id number]]    HIV-1/O/2 Ag/Ab w Reflex     Standing Status:   Future     Standing Expiration Date:   2024     Order Specific  Question:   Release to patient     Answer:   Routine Release [[id number]]    Hemoglobin A1c     Standing Status:   Future     Standing Expiration Date:   11/8/2024     Order Specific Question:   Release to patient     Answer:   Routine Release [[id number]]    CBC & Differential     Standing Status:   Future     Standing Expiration Date:   11/8/2024     Order Specific Question:   Manual Differential     Answer:   No     Order Specific Question:   Release to patient     Answer:   Routine Release [[id number]]       Return in about 1 year (around 11/8/2024) for Wellness visit.

## 2023-11-09 LAB — HCG INTACT+B SERPL-ACNC: <1 MIU/ML (ref 0–3)

## 2023-11-10 DIAGNOSIS — E07.9 DISORDER OF THYROID: ICD-10-CM

## 2023-11-10 DIAGNOSIS — N62 GYNECOMASTIA: Primary | ICD-10-CM

## 2023-11-10 DIAGNOSIS — E07.9 DISORDER OF THYROID, UNSPECIFIED: ICD-10-CM

## 2023-11-16 ENCOUNTER — TELEPHONE (OUTPATIENT)
Dept: FAMILY MEDICINE CLINIC | Facility: CLINIC | Age: 73
End: 2023-11-16
Payer: MEDICARE

## 2023-11-16 NOTE — TELEPHONE ENCOUNTER
Overall labs are reassuring.    I do want to add one additional lab for the nipple tenderness. Come by lab at during open hours no appt needed.      Cholesterol minimally elevated but not high enough to need medicine. I recommend a low-fat diet with a high intake of fruits and vegetables.  Avoid fatty meats like pork and beef.  Chicken and fish are good sources of protein.  Increase your whole grains such as whole-grain or still cut oatmeal in the morning.  Also recommend 150 minutes of physical activity weekly.

## 2024-08-21 ENCOUNTER — OFFICE VISIT (OUTPATIENT)
Dept: FAMILY MEDICINE CLINIC | Facility: CLINIC | Age: 74
End: 2024-08-21
Payer: MEDICARE

## 2024-08-21 VITALS
TEMPERATURE: 98 F | WEIGHT: 158.2 LBS | OXYGEN SATURATION: 99 % | BODY MASS INDEX: 19.77 KG/M2 | SYSTOLIC BLOOD PRESSURE: 122 MMHG | DIASTOLIC BLOOD PRESSURE: 80 MMHG | RESPIRATION RATE: 20 BRPM | HEART RATE: 60 BPM

## 2024-08-21 DIAGNOSIS — L40.9 PSORIASIS: ICD-10-CM

## 2024-08-21 DIAGNOSIS — L73.9 FOLLICULITIS: ICD-10-CM

## 2024-08-21 PROCEDURE — G2211 COMPLEX E/M VISIT ADD ON: HCPCS | Performed by: STUDENT IN AN ORGANIZED HEALTH CARE EDUCATION/TRAINING PROGRAM

## 2024-08-21 PROCEDURE — 99213 OFFICE O/P EST LOW 20 MIN: CPT | Performed by: STUDENT IN AN ORGANIZED HEALTH CARE EDUCATION/TRAINING PROGRAM

## 2024-08-21 PROCEDURE — 1126F AMNT PAIN NOTED NONE PRSNT: CPT | Performed by: STUDENT IN AN ORGANIZED HEALTH CARE EDUCATION/TRAINING PROGRAM

## 2024-08-21 RX ORDER — MUPIROCIN CALCIUM 20 MG/G
1 CREAM TOPICAL 3 TIMES DAILY
Qty: 30 G | Refills: 2 | Status: SHIPPED | OUTPATIENT
Start: 2024-08-21

## 2024-08-21 NOTE — PROGRESS NOTES
Established Patient Office Visit        Subjective      Chief Complaint:  Rash (Bumps all over body)      History of Present Illness: Marv Dobbins is a 74 y.o. male who presents for bumps to arms off and on for several months feels well. Rash to the left foot as well       There is no problem list on file for this patient.        Current Outpatient Medications:     fluocinolone 0.01 % cream, Apply 1 Application topically to the appropriate area as directed 2 (Two) Times a Day. No not apply to face, Disp: 60 g, Rfl: 6    hydrocortisone 2.5 % cream, Apply 1 application  topically to the appropriate area as directed 2 (Two) Times a Day. Apply to face and ears, Disp: 453.6 g, Rfl: 5    mupirocin (BACTROBAN) 2 % cream, Apply 1 Application topically to the appropriate area as directed 3 (Three) Times a Day. Apply to bumps on arms, Disp: 30 g, Rfl: 2       Objective     Physical Exam:   Vital Signs:   /80 (BP Location: Right arm, Patient Position: Sitting, Cuff Size: Adult)   Pulse 60   Temp 98 °F (36.7 °C) (Infrared)   Resp 20   Wt 71.8 kg (158 lb 3.2 oz)   SpO2 99%   BMI 19.77 kg/m²      Physical Exam  Constitutional:       General: He is not in acute distress.     Appearance: He is not ill-appearing.   Plaque and lichenification to the left foot  Small papules in a couple with central ulceration to the left arm present off and on for months         Assessment / Plan      Assessment/Plan:   Diagnoses and all orders for this visit:    1. Psoriasis  -     fluocinolone 0.01 % cream; Apply 1 Application topically to the appropriate area as directed 2 (Two) Times a Day. No not apply to face  Dispense: 60 g; Refill: 6  -     mupirocin (BACTROBAN) 2 % cream; Apply 1 Application topically to the appropriate area as directed 3 (Three) Times a Day. Apply to bumps on arms  Dispense: 30 g; Refill: 2    2. Folliculitis  -     mupirocin (BACTROBAN) 2 % cream; Apply 1 Application topically to the appropriate area as  directed 3 (Three) Times a Day. Apply to bumps on arms  Dispense: 30 g; Refill: 2         Follow-up for worsening or lack of improvement     I called him after the appointment and remind him to call for colonoscopy he had a positive Cologuard in 2022.  He is yet to schedule this.    Follow Up:   Return if symptoms worsen or fail to improve.    MDM:     Lonnie Alanis MD  Family Medicine - [facility]

## 2024-10-04 DIAGNOSIS — L40.9 PSORIASIS: ICD-10-CM

## 2024-10-04 RX ORDER — HYDROCORTISONE 2.5 %
CREAM (GRAM) TOPICAL
Qty: 454 G | Refills: 0 | Status: SHIPPED | OUTPATIENT
Start: 2024-10-04

## 2024-11-15 ENCOUNTER — OFFICE VISIT (OUTPATIENT)
Dept: FAMILY MEDICINE CLINIC | Facility: CLINIC | Age: 74
End: 2024-11-15
Payer: MEDICARE

## 2024-11-15 ENCOUNTER — LAB (OUTPATIENT)
Dept: LAB | Facility: HOSPITAL | Age: 74
End: 2024-11-15
Payer: MEDICARE

## 2024-11-15 VITALS
HEART RATE: 82 BPM | TEMPERATURE: 97.5 F | OXYGEN SATURATION: 100 % | WEIGHT: 158 LBS | SYSTOLIC BLOOD PRESSURE: 134 MMHG | BODY MASS INDEX: 19.65 KG/M2 | HEIGHT: 75 IN | RESPIRATION RATE: 20 BRPM | DIASTOLIC BLOOD PRESSURE: 64 MMHG

## 2024-11-15 DIAGNOSIS — R79.9 BLOOD CHEMISTRY ABNORMALITY: Primary | ICD-10-CM

## 2024-11-15 DIAGNOSIS — H53.9 CHANGE IN VISION: ICD-10-CM

## 2024-11-15 DIAGNOSIS — Z00.00 ENCOUNTER FOR ROUTINE ADULT HEALTH EXAMINATION WITHOUT ABNORMAL FINDINGS: ICD-10-CM

## 2024-11-15 DIAGNOSIS — L40.9 PSORIASIS: ICD-10-CM

## 2024-11-15 DIAGNOSIS — R19.5 POSITIVE COLORECTAL CANCER SCREENING USING COLOGUARD TEST: ICD-10-CM

## 2024-11-15 LAB
ALBUMIN SERPL-MCNC: 4.4 G/DL (ref 3.5–5.2)
ALBUMIN/GLOB SERPL: 1.5 G/DL
ALP SERPL-CCNC: 65 U/L (ref 39–117)
ALT SERPL W P-5'-P-CCNC: 15 U/L (ref 1–41)
ANION GAP SERPL CALCULATED.3IONS-SCNC: 10.7 MMOL/L (ref 5–15)
AST SERPL-CCNC: 23 U/L (ref 1–40)
BILIRUB SERPL-MCNC: 0.3 MG/DL (ref 0–1.2)
BUN SERPL-MCNC: 11 MG/DL (ref 8–23)
BUN/CREAT SERPL: 11.6 (ref 7–25)
CALCIUM SPEC-SCNC: 9.6 MG/DL (ref 8.6–10.5)
CHLORIDE SERPL-SCNC: 103 MMOL/L (ref 98–107)
CHOLEST SERPL-MCNC: 214 MG/DL (ref 0–200)
CO2 SERPL-SCNC: 27.3 MMOL/L (ref 22–29)
CREAT SERPL-MCNC: 0.95 MG/DL (ref 0.76–1.27)
DEPRECATED RDW RBC AUTO: 44.4 FL (ref 37–54)
EGFRCR SERPLBLD CKD-EPI 2021: 84.5 ML/MIN/1.73
ERYTHROCYTE [DISTWIDTH] IN BLOOD BY AUTOMATED COUNT: 12.7 % (ref 12.3–15.4)
GLOBULIN UR ELPH-MCNC: 3 GM/DL
GLUCOSE SERPL-MCNC: 76 MG/DL (ref 65–99)
HBA1C MFR BLD: 5.7 % (ref 4.8–5.6)
HCT VFR BLD AUTO: 43.8 % (ref 37.5–51)
HDLC SERPL-MCNC: 49 MG/DL (ref 40–60)
HGB BLD-MCNC: 14.9 G/DL (ref 13–17.7)
LDLC SERPL CALC-MCNC: 147 MG/DL (ref 0–100)
LDLC/HDLC SERPL: 2.96 {RATIO}
MCH RBC QN AUTO: 32.8 PG (ref 26.6–33)
MCHC RBC AUTO-ENTMCNC: 34 G/DL (ref 31.5–35.7)
MCV RBC AUTO: 96.5 FL (ref 79–97)
PLATELET # BLD AUTO: 247 10*3/MM3 (ref 140–450)
PMV BLD AUTO: 10.3 FL (ref 6–12)
POTASSIUM SERPL-SCNC: 4.5 MMOL/L (ref 3.5–5.2)
PROT SERPL-MCNC: 7.4 G/DL (ref 6–8.5)
RBC # BLD AUTO: 4.54 10*6/MM3 (ref 4.14–5.8)
SODIUM SERPL-SCNC: 141 MMOL/L (ref 136–145)
TRIGL SERPL-MCNC: 100 MG/DL (ref 0–150)
TSH SERPL DL<=0.05 MIU/L-ACNC: 1.39 UIU/ML (ref 0.27–4.2)
VLDLC SERPL-MCNC: 18 MG/DL (ref 5–40)
WBC NRBC COR # BLD AUTO: 3.27 10*3/MM3 (ref 3.4–10.8)

## 2024-11-15 PROCEDURE — 83036 HEMOGLOBIN GLYCOSYLATED A1C: CPT | Performed by: STUDENT IN AN ORGANIZED HEALTH CARE EDUCATION/TRAINING PROGRAM

## 2024-11-15 PROCEDURE — 85027 COMPLETE CBC AUTOMATED: CPT | Performed by: STUDENT IN AN ORGANIZED HEALTH CARE EDUCATION/TRAINING PROGRAM

## 2024-11-15 PROCEDURE — 84443 ASSAY THYROID STIM HORMONE: CPT | Performed by: STUDENT IN AN ORGANIZED HEALTH CARE EDUCATION/TRAINING PROGRAM

## 2024-11-15 PROCEDURE — 80053 COMPREHEN METABOLIC PANEL: CPT | Performed by: STUDENT IN AN ORGANIZED HEALTH CARE EDUCATION/TRAINING PROGRAM

## 2024-11-15 PROCEDURE — 80061 LIPID PANEL: CPT | Performed by: STUDENT IN AN ORGANIZED HEALTH CARE EDUCATION/TRAINING PROGRAM

## 2024-11-15 RX ORDER — HYDROCORTISONE 25 MG/G
CREAM TOPICAL 2 TIMES DAILY
Qty: 454 G | Refills: 1 | Status: SHIPPED | OUTPATIENT
Start: 2024-11-15

## 2024-11-15 RX ORDER — FLUOCINOLONE ACETONIDE 0.1 MG/G
1 CREAM TOPICAL 2 TIMES DAILY
Qty: 60 G | Refills: 11 | Status: SHIPPED | OUTPATIENT
Start: 2024-11-15

## 2024-11-15 NOTE — PROGRESS NOTES
Subjective   The ABCs of the Annual Wellness Visit  Medicare Wellness Visit      Marv Dobbins is a 74 y.o. patient who presents for a Medicare Wellness Visit.    The following portions of the patient's history were reviewed and   updated as appropriate: allergies, current medications, past family history, past medical history, past social history, past surgical history, and problem list.    Compared to one year ago, the patient's physical   health is the same.  Compared to one year ago, the patient's mental   health is the same.    Recent Hospitalizations:  He was not admitted to the hospital during the last year.     Current Medical Providers:  Patient Care Team:  Lonnie Alanis MD as PCP - General (Family Medicine)    Outpatient Medications Prior to Visit   Medication Sig Dispense Refill    mupirocin (BACTROBAN) 2 % cream Apply 1 Application topically to the appropriate area as directed 3 (Three) Times a Day. Apply to bumps on arms 30 g 2    fluocinolone 0.01 % cream Apply 1 Application topically to the appropriate area as directed 2 (Two) Times a Day. No not apply to face 60 g 6    hydrocortisone 2.5 % cream APPLY 1 APPLICATION TOPICALLY TO THE AFFECTED AREA(S) AS DIRECTED 2 TIMES A DAY, APPLY TO FACE AND EARS 454 g 0     No facility-administered medications prior to visit.     No opioid medication identified on active medication list. I have reviewed chart for other potential  high risk medication/s and harmful drug interactions in the elderly.      Aspirin is not on active medication list.  Aspirin use is not indicated based on review of current medical condition/s. Risk of harm outweighs potential benefits.  .    Patient Active Problem List   Diagnosis    Psoriasis    Positive colorectal cancer screening using Cologuard test     Advance Care Planning Advance Directive is not on file.  ACP discussion was held with the patient during this visit. Patient has an advance directive (not in EMR), copy requested.   "          Objective   Vitals:    11/15/24 1318   BP: 134/64   Pulse: 82   Resp: 20   Temp: 97.5 °F (36.4 °C)   TempSrc: Temporal   SpO2: 100%   Weight: 71.7 kg (158 lb)   Height: 190.5 cm (75\")   PainSc: 0-No pain       Estimated body mass index is 19.75 kg/m² as calculated from the following:    Height as of this encounter: 190.5 cm (75\").    Weight as of this encounter: 71.7 kg (158 lb).    BMI is within normal parameters. No other follow-up for BMI required.       Does the patient have evidence of cognitive impairment? No                                                                                               Health  Risk Assessment    Smoking Status:  Social History     Tobacco Use   Smoking Status Every Day    Current packs/day: 0.25    Average packs/day: 0.3 packs/day for 50.0 years (12.5 ttl pk-yrs)    Types: Cigarettes    Passive exposure: Current   Smokeless Tobacco Never     Alcohol Consumption:  Social History     Substance and Sexual Activity   Alcohol Use Yes    Comment: occassionally       Fall Risk Screen  STEADI Fall Risk Assessment was completed, and patient is at LOW risk for falls.Assessment completed on:11/15/2024    Depression Screening   Little interest or pleasure in doing things? Not at all   Feeling down, depressed, or hopeless? Not at all   PHQ-2 Total Score 0      Health Habits and Functional and Cognitive Screenin/15/2024     1:15 PM   Functional & Cognitive Status   Do you have difficulty preparing food and eating? No   Do you have difficulty bathing yourself, getting dressed or grooming yourself? No   Do you have difficulty using the toilet? No   Do you have difficulty moving around from place to place? No   Do you have trouble with steps or getting out of a bed or a chair? No   Current Diet Well Balanced Diet   Dental Exam Not up to date   Eye Exam Not up to date   Exercise (times per week) 0 times per week   Current Exercises Include No Regular Exercise   Do you need " help using the phone?  No   Are you deaf or do you have serious difficulty hearing?  No   Do you need help to go to places out of walking distance? No   Do you need help shopping? No   Do you need help preparing meals?  No   Do you need help with housework?  No   Do you need help with laundry? No   Do you need help taking your medications? No   Do you need help managing money? No   Do you ever drive or ride in a car without wearing a seat belt? No   Have you felt unusual stress, anger or loneliness in the last month? No   Who do you live with? Alone   If you need help, do you have trouble finding someone available to you? No   Have you been bothered in the last four weeks by sexual problems? No   Do you have difficulty concentrating, remembering or making decisions? No           Age-appropriate Screening Schedule:  Refer to the list below for future screening recommendations based on patient's age, sex and/or medical conditions. Orders for these recommended tests are listed in the plan section. The patient has been provided with a written plan.    Health Maintenance List  Health Maintenance   Topic Date Due    ZOSTER VACCINE (1 of 2) 11/15/2024 (Originally 6/11/2001)    COVID-19 Vaccine (4 - 2024-25 season) 02/04/2025 (Originally 9/1/2024)    Pneumococcal Vaccine 65+ (1 of 2 - PCV) 11/15/2025 (Originally 6/11/1956)    TDAP/TD VACCINES (1 - Tdap) 11/15/2025 (Originally [birth year]/11/1970)    COLORECTAL CANCER SCREENING  08/26/2025    ANNUAL WELLNESS VISIT  11/15/2025    HEPATITIS C SCREENING  Completed    AAA SCREEN (ONE-TIME)  Completed                                                                                                                                                CMS Preventative Services Quick Reference  Risk Factors Identified During Encounter  None Identified    RRR  CTAB  ABD soft no mass     The above risks/problems have been discussed with the patient.  Pertinent information has been shared with the  patient in the After Visit Summary.  An After Visit Summary and PPPS were made available to the patient.    Follow Up:   Next Medicare Wellness visit to be scheduled in 1 year.     Assessment & Plan  Encounter for routine adult health examination without abnormal findings    Orders:    Comprehensive Metabolic Panel; Future    CBC (No Diff); Future    Lipid Panel; Future    TSH Rfx On Abnormal To Free T4; Future    Hemoglobin A1c; Future    Psoriasis  Stable refill below  Orders:    hydrocortisone 2.5 % cream; Apply  topically to the appropriate area as directed 2 (Two) Times a Day.    fluocinolone 0.01 % cream; Apply 1 Application topically to the appropriate area as directed 2 (Two) Times a Day. No not apply to face    Change in vision    Orders:    Ambulatory Referral to Ophthalmology    Positive colorectal cancer screening using Cologuard test         Blood chemistry abnormality    Orders:    Hemoglobin A1c; Future       Reminded him of positive Cologuard.  Declines colonoscopy states understanding of possibility of colon cancer.   Declines vaccine     Follow Up:   Return in about 1 year (around 11/15/2025) for Medicare Wellness.    Lonnie Alanis MD  [facility]

## 2024-11-15 NOTE — ASSESSMENT & PLAN NOTE
Stable refill below  Orders:    hydrocortisone 2.5 % cream; Apply  topically to the appropriate area as directed 2 (Two) Times a Day.    fluocinolone 0.01 % cream; Apply 1 Application topically to the appropriate area as directed 2 (Two) Times a Day. No not apply to face

## 2024-11-18 DIAGNOSIS — D72.819 LEUKOPENIA, UNSPECIFIED TYPE: Primary | ICD-10-CM

## 2024-11-20 DIAGNOSIS — D72.819 LEUKOPENIA, UNSPECIFIED TYPE: Primary | ICD-10-CM
